# Patient Record
Sex: FEMALE | Race: WHITE | NOT HISPANIC OR LATINO | URBAN - METROPOLITAN AREA
[De-identification: names, ages, dates, MRNs, and addresses within clinical notes are randomized per-mention and may not be internally consistent; named-entity substitution may affect disease eponyms.]

---

## 2018-09-10 ENCOUNTER — APPOINTMENT (OUTPATIENT)
Age: 58
Setting detail: DERMATOLOGY
End: 2018-09-24

## 2018-09-10 VITALS — HEIGHT: 65 IN | WEIGHT: 150 LBS

## 2018-09-10 DIAGNOSIS — L57.8 OTHER SKIN CHANGES DUE TO CHRONIC EXPOSURE TO NONIONIZING RADIATION: ICD-10-CM

## 2018-09-10 DIAGNOSIS — D22 MELANOCYTIC NEVI: ICD-10-CM

## 2018-09-10 DIAGNOSIS — L82.1 OTHER SEBORRHEIC KERATOSIS: ICD-10-CM

## 2018-09-10 PROBLEM — C44.722 SQUAMOUS CELL CARCINOMA OF SKIN OF RIGHT LOWER LIMB, INCLUDING HIP: Status: ACTIVE | Noted: 2018-09-10

## 2018-09-10 PROBLEM — D22.5 MELANOCYTIC NEVI OF TRUNK: Status: ACTIVE | Noted: 2018-09-10

## 2018-09-10 PROBLEM — Z85.828 PERSONAL HISTORY OF OTHER MALIGNANT NEOPLASM OF SKIN: Status: ACTIVE | Noted: 2018-09-10

## 2018-09-10 PROCEDURE — OTHER REASSURANCE: OTHER

## 2018-09-10 PROCEDURE — OTHER TREATMENT REGIMEN: OTHER

## 2018-09-10 PROCEDURE — OTHER SHAVE REMOVAL AND DESTRUCTION: OTHER

## 2018-09-10 PROCEDURE — OTHER SUNSCREEN RECOMMENDATIONS: OTHER

## 2018-09-10 PROCEDURE — 17262 DSTRJ MAL LES T/A/L 1.1-2.0: CPT

## 2018-09-10 PROCEDURE — OTHER COUNSELING: OTHER

## 2018-09-10 PROCEDURE — OTHER FOLLOW UP FOR NEXT VISIT: OTHER

## 2018-09-10 PROCEDURE — OTHER MIPS QUALITY: OTHER

## 2018-09-10 PROCEDURE — 99203 OFFICE O/P NEW LOW 30 MIN: CPT | Mod: 25

## 2018-09-10 ASSESSMENT — LOCATION DETAILED DESCRIPTION DERM
LOCATION DETAILED: LEFT ANKLE
LOCATION DETAILED: INFERIOR THORACIC SPINE
LOCATION DETAILED: RIGHT INFERIOR MEDIAL UPPER BACK
LOCATION DETAILED: RIGHT SUPERIOR MEDIAL UPPER BACK
LOCATION DETAILED: RIGHT ANKLE

## 2018-09-10 ASSESSMENT — LOCATION SIMPLE DESCRIPTION DERM
LOCATION SIMPLE: RIGHT ANKLE
LOCATION SIMPLE: RIGHT UPPER BACK
LOCATION SIMPLE: LEFT ANKLE
LOCATION SIMPLE: UPPER BACK

## 2018-09-10 ASSESSMENT — LOCATION ZONE DERM
LOCATION ZONE: TRUNK
LOCATION ZONE: LEG

## 2018-09-10 ASSESSMENT — PAIN INTENSITY VAS: HOW INTENSE IS YOUR PAIN 0 BEING NO PAIN, 10 BEING THE MOST SEVERE PAIN POSSIBLE?: NO PAIN

## 2018-09-10 NOTE — PROCEDURE: TREATMENT REGIMEN
Plan: I was able to perform fairly significant electrodesiccation and curettage so hopefully this will be the only treatment that she needs however, I did inform the patient that based on the results we may need to refer for MOHs. Pathology shows invasive squamous cell carcinoma. It is possible that this was fully treated at the time of service but we would need to see her back in one month to determine if further treatment or referral for MOHs is needed
Detail Level: Detailed

## 2018-09-10 NOTE — HPI: EVALUATION OF SKIN LESION(S)
How Severe Are Your Spot(S)?: moderate
Have Your Spot(S) Been Treated In The Past?: has not been treated
Hpi Title: Evaluation of a Skin Lesion
Additional History: Patient has a rapidly growing lesion on the right mid-lower legs for the past 3 to 4 weeks. Patient states it started out of nowhere, it is tender and sore. She does have a previous history of basal cell carcinoma treated over 10 years ago. No other complaints today although she does have several other spots she would like examined. Currently she does not get much sun exposure but has in the past

## 2018-09-10 NOTE — PROCEDURE: SHAVE REMOVAL AND DESTRUCTION
Bill For Surgical Tray: no
Anesthesia Volume In Cc: 1.4
Cautery Type: electrodesiccation
Billing Type: Third-Party Bill
Detail Level: Detailed
Notification Instructions: Patient will be notified of biopsy results. However, patient instructed to call the office if not contacted within 1 week.
Path Notes (To The Dermatopathologist): Possible SCC, possible keratoacanthoma,  please evaluate
Dressing: dry sterile dressing
Number Of Curettages: 3
Bill As?: Note: Bill Malignant Destruction If Path Confirms Malignant Lesion. Only Bill As Shave Removal If Path Comes Back Benign. Do Not Bill Shave Removal On Malignant Lesions.: Malignant Destruction
Hemostasis: Electrocautery
Consent: Written consent was obtained and risks were reviewed including but not limited to scarring, infection, bleeding, scabbing, incomplete removal, nerve damage and allergy to anesthesia.
Anesthesia Volume In Cc: 0
Size After Destruction (Required For Destruction Billing): 1.3
Was Curettage Performed?: Yes
Post-Care Instructions: I reviewed with the patient in detail post-care instructions. Patient is to keep the biopsy site dry overnight, and then apply bacitracin twice daily until healed. Patient may apply hydrogen peroxide soaks to remove any crusting.
Wound Care: Polysporin ointment
Size Of Lesion In Cm: 1.1
Anesthesia Type: 1% Xylocaine with epinephrine

## 2018-09-10 NOTE — PROCEDURE: REASSURANCE
Include Location In Plan?: Yes
Hide Include Location In Plan Question?: No
Additional Note: Patient reassured these are benign, no treatment needed. I do recommend follow up annually for full body skin check
Detail Level: Simple
Additional Note: Normal FBSE, no abnormal moles or suspicious lesions. Follow up annually
Additional Note: Normal FBSE, no other skin cancer or pre cancer noted. Follow up annually
Detail Level: Generalized

## 2018-10-22 ENCOUNTER — APPOINTMENT (OUTPATIENT)
Age: 58
Setting detail: DERMATOLOGY
End: 2018-10-23

## 2018-10-22 VITALS — WEIGHT: 152 LBS

## 2018-10-22 DIAGNOSIS — Z85.828 PERSONAL HISTORY OF OTHER MALIGNANT NEOPLASM OF SKIN: ICD-10-CM

## 2018-10-22 DIAGNOSIS — D22 MELANOCYTIC NEVI: ICD-10-CM

## 2018-10-22 PROBLEM — D22.39 MELANOCYTIC NEVI OF OTHER PARTS OF FACE: Status: ACTIVE | Noted: 2018-10-22

## 2018-10-22 PROCEDURE — OTHER COUNSELING: OTHER

## 2018-10-22 PROCEDURE — OTHER MIPS QUALITY: OTHER

## 2018-10-22 PROCEDURE — OTHER REASSURANCE: OTHER

## 2018-10-22 PROCEDURE — OTHER FOLLOW UP FOR NEXT VISIT: OTHER

## 2018-10-22 PROCEDURE — OTHER TREATMENT REGIMEN: OTHER

## 2018-10-22 PROCEDURE — 99213 OFFICE O/P EST LOW 20 MIN: CPT

## 2018-10-22 ASSESSMENT — LOCATION DETAILED DESCRIPTION DERM
LOCATION DETAILED: RIGHT PROXIMAL PRETIBIAL REGION
LOCATION DETAILED: RIGHT SUPERIOR FOREHEAD

## 2018-10-22 ASSESSMENT — LOCATION SIMPLE DESCRIPTION DERM
LOCATION SIMPLE: RIGHT PRETIBIAL REGION
LOCATION SIMPLE: RIGHT FOREHEAD

## 2018-10-22 ASSESSMENT — LOCATION ZONE DERM
LOCATION ZONE: FACE
LOCATION ZONE: LEG

## 2018-10-22 NOTE — PROCEDURE: REASSURANCE
Additional Note: Benign lesion, no treatment needed, follow up annually
Detail Level: Simple
Hide Additional Notes?: No
Include Location In Plan?: Yes

## 2018-10-22 NOTE — PROCEDURE: TREATMENT REGIMEN
Detail Level: Detailed
Plan: The surgical site appears well healed, there is no evidence clinically of any residual. We did discuss the possibility that this could have some residual but based on the clinical exam we both decided on watching and observing the area. She will call or follow up if there are any changes. Follow up in six months

## 2018-10-22 NOTE — PROCEDURE: FOLLOW UP FOR NEXT VISIT
Scheduled For Follow Up In (Optional): 6 months
Detail Level: Simple
Instructions (Optional): FBSE at follow up

## 2020-09-01 ENCOUNTER — APPOINTMENT (OUTPATIENT)
Age: 60
Setting detail: DERMATOLOGY
End: 2020-09-15

## 2020-09-01 VITALS — TEMPERATURE: 97.9 F | WEIGHT: 160 LBS | HEIGHT: 65 IN

## 2020-09-01 DIAGNOSIS — D22 MELANOCYTIC NEVI: ICD-10-CM

## 2020-09-01 DIAGNOSIS — Z85.828 PERSONAL HISTORY OF OTHER MALIGNANT NEOPLASM OF SKIN: ICD-10-CM

## 2020-09-01 DIAGNOSIS — L57.8 OTHER SKIN CHANGES DUE TO CHRONIC EXPOSURE TO NONIONIZING RADIATION: ICD-10-CM

## 2020-09-01 DIAGNOSIS — L82.0 INFLAMED SEBORRHEIC KERATOSIS: ICD-10-CM

## 2020-09-01 PROBLEM — D22.5 MELANOCYTIC NEVI OF TRUNK: Status: ACTIVE | Noted: 2020-09-01

## 2020-09-01 PROBLEM — D22.39 MELANOCYTIC NEVI OF OTHER PARTS OF FACE: Status: ACTIVE | Noted: 2020-09-01

## 2020-09-01 PROBLEM — C44.519 BASAL CELL CARCINOMA OF SKIN OF OTHER PART OF TRUNK: Status: ACTIVE | Noted: 2020-09-01

## 2020-09-01 PROCEDURE — OTHER SHAVE REMOVAL AND DESTRUCTION: OTHER

## 2020-09-01 PROCEDURE — 17262 DSTRJ MAL LES T/A/L 1.1-2.0: CPT

## 2020-09-01 PROCEDURE — OTHER SUNSCREEN RECOMMENDATIONS: OTHER

## 2020-09-01 PROCEDURE — 99214 OFFICE O/P EST MOD 30 MIN: CPT | Mod: 25

## 2020-09-01 PROCEDURE — OTHER COUNSELING: OTHER

## 2020-09-01 PROCEDURE — OTHER MIPS QUALITY: OTHER

## 2020-09-01 PROCEDURE — OTHER TREATMENT REGIMEN: OTHER

## 2020-09-01 PROCEDURE — 17110 DESTRUCT B9 LESION 1-14: CPT | Mod: 59

## 2020-09-01 PROCEDURE — OTHER LIQUID NITROGEN: OTHER

## 2020-09-01 PROCEDURE — OTHER REASSURANCE: OTHER

## 2020-09-01 PROCEDURE — OTHER FOLLOW UP FOR NEXT VISIT: OTHER

## 2020-09-01 ASSESSMENT — PAIN INTENSITY VAS: HOW INTENSE IS YOUR PAIN 0 BEING NO PAIN, 10 BEING THE MOST SEVERE PAIN POSSIBLE?: NO PAIN

## 2020-09-01 ASSESSMENT — LOCATION SIMPLE DESCRIPTION DERM
LOCATION SIMPLE: RIGHT PRETIBIAL REGION
LOCATION SIMPLE: RIGHT FOREARM
LOCATION SIMPLE: INFERIOR FOREHEAD
LOCATION SIMPLE: UPPER BACK
LOCATION SIMPLE: CHEST

## 2020-09-01 ASSESSMENT — LOCATION DETAILED DESCRIPTION DERM
LOCATION DETAILED: SUPERIOR THORACIC SPINE
LOCATION DETAILED: LEFT MEDIAL SUPERIOR CHEST
LOCATION DETAILED: RIGHT VENTRAL DISTAL FOREARM
LOCATION DETAILED: INFERIOR MID FOREHEAD
LOCATION DETAILED: UPPER STERNUM
LOCATION DETAILED: RIGHT PROXIMAL PRETIBIAL REGION

## 2020-09-01 ASSESSMENT — LOCATION ZONE DERM
LOCATION ZONE: FACE
LOCATION ZONE: LEG
LOCATION ZONE: TRUNK
LOCATION ZONE: ARM

## 2020-09-01 NOTE — HPI: SKIN LESIONS
How Severe Is Your Skin Lesion?: mild
Have Your Skin Lesions Been Treated?: not been treated
Is This A New Presentation, Or A Follow-Up?: Skin Lesions
Additional History: Patient here for her annual full body skin exam. She does have one lesion on the left upper abdomen that she has noticed is getting slightly larger and will occasionally get irritated. Otherwise no specific complaints. She does have several raised, rough lesions that will occasionally bother her but are otherwise asymptomatic. She is good about sunscreen use in Sun protection but wasn’t when she was younger

## 2020-09-01 NOTE — PROCEDURE: TREATMENT REGIMEN
Plan: This should lead to resolution. Patient will follow up within a month if no improvement, otherwise follow up annually.
Plan: Clinically this appears consistent with basal cell carcinoma. Shave removal followed by electrodesiccation and curettage. Follow up will be based on the results. Pathology shows basal cell carcinoma, superficial and nodular type, extending to the deep edge. This has likely been fully treated but the patient will need to follow up in 4 to 6 weeks to recheck the surgical site to ensure there is no residual
Detail Level: Generalized
Detail Level: Detailed
Plan: ADAM, [place]. Follow up annually.  Clinically this appears completely resolved with no evidence of recurrence
Initiate Treatment: Shave removal and destruction
Initiate Treatment: LN multi

## 2020-09-01 NOTE — PROCEDURE: SHAVE REMOVAL AND DESTRUCTION
Size After Destruction (Required For Destruction Billing): 1.4
Hemostasis: Electrocautery
Number Of Curettages: 3
Bill [id number] For Specimen Handling/Conveyance To Laboratory?: no
Anesthesia Volume In Cc: 1.5
Was Curettage Performed?: Yes
Anesthesia Type: 1% Xylocaine with epinephrine
Size Of Lesion In Cm: 1.2
Dressing: Band-Aid
Detail Level: Detailed
Wound Care: Polysporin ointment
Cautery Type: electrodesiccation
Notification Instructions: Patient will be notified of biopsy results. However, patient instructed to call the office if not contacted within 1 week.
Consent: Written consent was obtained and risks were reviewed including but not limited to scarring, infection, bleeding, scabbing, incomplete removal, nerve damage and allergy to anesthesia.
Path Notes (To The Dermatopathologist): Possible BCC, please evaluate
Billing Type: Third-Party Bill
Bill As?: Note: Bill Malignant Destruction If Path Confirms Malignant Lesion. Only Bill As Shave Removal If Path Comes Back Benign. Do Not Bill Shave Removal On Malignant Lesions.: Malignant Destruction
Post-Care Instructions: I reviewed with the patient in detail post-care instructions. Patient is to keep the biopsy site dry overnight, and then apply bacitracin twice daily until healed. Patient may apply hydrogen peroxide soaks to remove any crusting.

## 2020-09-01 NOTE — PROCEDURE: LIQUID NITROGEN
Medical Necessity Information: It is in your best interest to select a reason for this procedure from the list below. All of these items fulfill various CMS LCD requirements except the new and changing color options.
Aperture Size (Optional): C
Medical Necessity Clause: This procedure was medically necessary because the lesions that were treated were: these are increasing in size and causing moderate irritation
Consent: The patient's consent was obtained including but not limited to risks of crusting, scabbing, blistering, scarring, darker or lighter pigmentary change, recurrence, incomplete removal and infection.
Total Number Of Lesions Treated: 2
Number Of Freeze-Thaw Cycles: 1 freeze-thaw cycle
Render Post Care In The Note?: yes
Render In Bullet Format When Appropriate: No
Post-Care Instructions: I reviewed with the patient in detail post-care instructions. Patient is to wear sunprotection, and avoid picking at any of the treated lesions. Pt may apply Vaseline to crusted or scabbing areas.
Duration Of Freeze Thaw-Cycle (Seconds): 5
Detail Level: Detailed

## 2020-09-01 NOTE — PROCEDURE: REASSURANCE
Additional Note: Otherwise normal FBSE, patient counseled regarding sunscreen and how to use properly. Follow up annually.
Include Location In Plan?: Yes
Additional Note: Otherwise normal no other abnormal moles or suspicious lesions. Follow up if lesions change in size, shape or color. Otherwise follow up annually.
Detail Level: Generalized
Hide Additional Notes?: No

## 2020-09-01 NOTE — PROCEDURE: FOLLOW UP FOR NEXT VISIT
Scheduled For Follow Up In (Optional): prn
Instructions (Optional): FBSE at follow up
Detail Level: Simple
Instructions (Optional): If no improvement
Scheduled For Follow Up In (Optional): 4-6 weeks
Scheduled For Follow Up In (Optional): 1 year

## 2022-06-13 ENCOUNTER — APPOINTMENT (OUTPATIENT)
Age: 62
Setting detail: DERMATOLOGY
End: 2022-06-18

## 2022-06-13 DIAGNOSIS — D22 MELANOCYTIC NEVI: ICD-10-CM

## 2022-06-13 DIAGNOSIS — L82.1 OTHER SEBORRHEIC KERATOSIS: ICD-10-CM

## 2022-06-13 DIAGNOSIS — L57.8 OTHER SKIN CHANGES DUE TO CHRONIC EXPOSURE TO NONIONIZING RADIATION: ICD-10-CM

## 2022-06-13 DIAGNOSIS — Z85.828 PERSONAL HISTORY OF OTHER MALIGNANT NEOPLASM OF SKIN: ICD-10-CM

## 2022-06-13 PROBLEM — L85.3 XEROSIS CUTIS: Status: ACTIVE | Noted: 2022-06-13

## 2022-06-13 PROBLEM — D22.5 MELANOCYTIC NEVI OF TRUNK: Status: ACTIVE | Noted: 2022-06-13

## 2022-06-13 PROCEDURE — OTHER FOLLOW UP FOR NEXT VISIT: OTHER

## 2022-06-13 PROCEDURE — OTHER SUNSCREEN RECOMMENDATIONS: OTHER

## 2022-06-13 PROCEDURE — 99213 OFFICE O/P EST LOW 20 MIN: CPT

## 2022-06-13 PROCEDURE — OTHER COUNSELING: OTHER

## 2022-06-13 PROCEDURE — OTHER REASSURANCE: OTHER

## 2022-06-13 PROCEDURE — OTHER TREATMENT REGIMEN: OTHER

## 2022-06-13 ASSESSMENT — LOCATION DETAILED DESCRIPTION DERM
LOCATION DETAILED: RIGHT PROXIMAL PRETIBIAL REGION
LOCATION DETAILED: LEFT INFERIOR MEDIAL FOREHEAD
LOCATION DETAILED: RIGHT SUPERIOR MEDIAL UPPER BACK
LOCATION DETAILED: RIGHT SUPERIOR MEDIAL LOWER BACK
LOCATION DETAILED: PERIUMBILICAL SKIN
LOCATION DETAILED: LEFT LATERAL ABDOMEN
LOCATION DETAILED: LEFT RIB CAGE

## 2022-06-13 ASSESSMENT — LOCATION ZONE DERM
LOCATION ZONE: LEG
LOCATION ZONE: TRUNK
LOCATION ZONE: FACE

## 2022-06-13 ASSESSMENT — LOCATION SIMPLE DESCRIPTION DERM
LOCATION SIMPLE: RIGHT LOWER BACK
LOCATION SIMPLE: ABDOMEN
LOCATION SIMPLE: RIGHT UPPER BACK
LOCATION SIMPLE: LEFT FOREHEAD
LOCATION SIMPLE: RIGHT PRETIBIAL REGION

## 2022-06-13 ASSESSMENT — PAIN INTENSITY VAS: HOW INTENSE IS YOUR PAIN 0 BEING NO PAIN, 10 BEING THE MOST SEVERE PAIN POSSIBLE?: NO PAIN

## 2022-06-13 NOTE — PROCEDURE: TREATMENT REGIMEN
Detail Level: Detailed
Plan: ADAM, [place]. Follow up annually.  Clinically this appears completely resolved with no evidence of recurrence
Detail Level: Simple
Plan: Surgical site as well healed with no evidence of residual or recurrence. Patient will continue to monitor for any changes. Otherwise follow up annually for her full body skin check

## 2022-06-13 NOTE — HPI: SKIN LESION
What Type Of Note Output Would You Prefer (Optional)?: Bullet Format
How Severe Is Your Skin Lesion?: moderate
Has Your Skin Lesion Been Treated?: not been treated
Is This A New Presentation, Or A Follow-Up?: Skin Lesion
Additional History: Pt is here for fbse, pt concern about lesion on side of nose, and lesion on lower leg. Pt does spend time in the sun but is good about sunscreen use and sun protection.

## 2022-06-13 NOTE — PROCEDURE: REASSURANCE
Include Location In Plan?: Yes
Additional Note: Normal fbse, patient reassured no abnormal moles or suspicious lesions. Follow up if lesions change or become painful, otherwise follow up annually.
Hide Include Location In Plan Question?: No
Additional Note: Normal fbse, patient counseled regarding sunscreen and how to use properly. Follow up if areas change or become painful, otherwise follow up annually.
Detail Level: Generalized
Detail Level: Zone
Additional Note: Patient reassured these are benign, no treatment needed unless these become irritated

## 2022-06-13 NOTE — PROCEDURE: FOLLOW UP FOR NEXT VISIT
Scheduled For Follow Up In (Optional): 1 year
Instructions (Optional): FBSE at follow up
Detail Level: Simple
Detail Level: Generalized

## 2023-06-07 NOTE — PROCEDURE: FOLLOW UP FOR NEXT VISIT
Instructions (Optional): Re check SCC site on leg and FBSE in 6 months
Detail Level: Simple
Scheduled For Follow Up In (Optional): 1 month
Cimzia Pregnancy And Lactation Text: This medication crosses the placenta but can be considered safe in certain situations. Cimzia may be excreted in breast milk.